# Patient Record
Sex: MALE | Race: WHITE | Employment: FULL TIME | URBAN - METROPOLITAN AREA
[De-identification: names, ages, dates, MRNs, and addresses within clinical notes are randomized per-mention and may not be internally consistent; named-entity substitution may affect disease eponyms.]

---

## 2017-10-10 ENCOUNTER — HOSPITAL ENCOUNTER (OUTPATIENT)
Dept: SURGERY | Age: 62
Discharge: HOME | End: 2017-10-10
Attending: INTERNAL MEDICINE | Admitting: INTERNAL MEDICINE

## 2017-10-10 VITALS
OXYGEN SATURATION: 99 % | BODY MASS INDEX: 31.34 KG/M2 | WEIGHT: 195 LBS | TEMPERATURE: 97.2 F | DIASTOLIC BLOOD PRESSURE: 68 MMHG | HEIGHT: 66 IN | RESPIRATION RATE: 16 BRPM | SYSTOLIC BLOOD PRESSURE: 113 MMHG | HEART RATE: 74 BPM

## 2017-10-10 RX ORDER — SODIUM CHLORIDE, SODIUM LACTATE, POTASSIUM CHLORIDE, CALCIUM CHLORIDE 600; 310; 30; 20 MG/100ML; MG/100ML; MG/100ML; MG/100ML
INJECTION, SOLUTION INTRAVENOUS CONTINUOUS
Status: DISCONTINUED | OUTPATIENT
Start: 2017-10-10 | End: 2017-10-11 | Stop reason: HOSPADM

## 2017-10-10 RX ORDER — LIDOCAINE HYDROCHLORIDE 10 MG/ML
0.1 INJECTION, SOLUTION EPIDURAL; INFILTRATION; INTRACAUDAL; PERINEURAL ONCE
Status: DISCONTINUED | OUTPATIENT
Start: 2017-10-10 | End: 2017-10-11 | Stop reason: HOSPADM

## 2017-10-10 RX ORDER — LISINOPRIL 20 MG/1
20 TABLET ORAL DAILY
COMMUNITY

## 2017-10-10 RX ADMIN — SODIUM CHLORIDE, SODIUM LACTATE, POTASSIUM CHLORIDE, CALCIUM CHLORIDE: 600; 310; 30; 20 INJECTION, SOLUTION INTRAVENOUS at 07:34

## 2017-10-10 ASSESSMENT — PAIN SCALES - GENERAL
PAINLEVEL_OUTOF10: 0

## 2017-10-10 ASSESSMENT — PAIN - FUNCTIONAL ASSESSMENT: PAIN_FUNCTIONAL_ASSESSMENT: 0-10

## 2017-10-10 NOTE — H&P
Pre-sedation Assessment    History and Physical / Pre-Sedation Assessment  Patient:  Jeraldene Sandhoff   :   1955     Intended Procedure: EGD      HPI: dysphagia  Nurses notes reviewed and agreed. Medications reviewed  Allergies: Allergies   Allergen Reactions    Atorvastatin      Myalgia at higher doses    Lipitor      Myalgia             Physical Exam:  Vital Signs: /76   Pulse 77   Temp 97.2 °F (36.2 °C) (Temporal)   Resp 18   Ht 5' 6\" (1.676 m)   Wt 195 lb (88.5 kg)   SpO2 93%   BMI 31.47 kg/m²  Body mass index is 31.47 kg/m². Airway:Normal  Cardiac:Normal  Pulmonary:Normal  Abdomen:Normal  Specific to procedure:       Pre-Procedure Assessment/Plan:  ASA 2 - Patient with mild systemic disease with no functional limitations    Level of Sedation Plan: Moderate sedation    Post Procedure plan: Return to same level of care    I assessed the patient and find that the patient is in satisfactory condition to proceed with the planned procedure and sedation plan. I have explained the risk, benefits, and alternatives to the procedure. The patient understands and agrees to proceed.   Yes    Nona Camarillo  7:54 AM 10/10/2017

## 2017-10-10 NOTE — PROCEDURES
were not  visualized. Antegrade and retrograde views of a fully distended  stomach did not demonstrate mucosal abnormalities nor gastric fold  thickening. Scope was then withdrawn through a 2-cm hiatal hernia. The GE junction was noted to be at 38 cm. Signs of Recio's were not  appreciated. A somewhat fibrous Schatzki's ring was appreciated. Through the scope, balloon was inflated to 15 mm for 1 minute. It was  then deflated. The rent in the ring was noted. Spontaneous hemostasis  was appreciated. Yellowish nodules were biopsied. These were located  in the proximal esophagus. A gastric inlet patch was noted. The  scope was then withdrawn. The patient's procedure was terminated,  well tolerated, no immediate complications. POSTPROCEDURE DIAGNOSES:  Esophageal nodule, esophageal ring status  post dilatation, hiatal hernia. I will contact this gentleman with biopsy results. He has been  instructed to call should dysphagia recur.         Sara De Leon MD    D: 10/10/2017 8:12:44       T: 10/10/2017 8:15:25     [email or web address]  Job#: [id number]     Doc#: [id number]    CC:  Zackery Snider PA-C

## 2017-10-10 NOTE — PROGRESS NOTES
Discharge in no distress: accompanied pt to passenger side of car with family/significant other driving. Resp WNL. Pt's significant other verbalized understanding of d/c instructions and verbalizes no additional questions.  Pain  in a tolerable level=0

## 2018-01-31 ENCOUNTER — OFFICE VISIT (OUTPATIENT)
Dept: ORTHOPEDIC SURGERY | Age: 63
End: 2018-01-31

## 2018-01-31 VITALS
BODY MASS INDEX: 31.36 KG/M2 | HEART RATE: 93 BPM | WEIGHT: 195.11 LBS | SYSTOLIC BLOOD PRESSURE: 123 MMHG | HEIGHT: 66 IN | DIASTOLIC BLOOD PRESSURE: 68 MMHG

## 2018-01-31 DIAGNOSIS — S39.012A LUMBAR STRAIN, INITIAL ENCOUNTER: ICD-10-CM

## 2018-01-31 DIAGNOSIS — M54.50 LUMBAR SPINE PAIN: Primary | ICD-10-CM

## 2018-01-31 PROCEDURE — 99203 OFFICE O/P NEW LOW 30 MIN: CPT | Performed by: PHYSICIAN ASSISTANT

## 2018-02-01 NOTE — PROGRESS NOTES
Patient: Franky Kapadia    MRN: [id number]  YOB: 1955          Age: 63 y.o. Sex: male    Subjective     Chief Complaint:  Back Pain (LBP RIGHT SIDED PAIN FROM MOVING A COOLER ON SUNDAY)      History of Present Illness:  Franky Kapadia is a 63 y.o. male who presents tonight for evaluation of low back pain. Patient states that on Sunday he was moving a heavy cooler when he began experiencing pain over the right low back. He states that he was in [place] and had to return to [place] in the drive back increased his back pain. He states by Sunday night he was unable to get out of the car without assistance. He did contact his primary care physician who prescribed Robaxin and told him to take ibuprofen 800 mg 3 times a day with food. He states that today his pain has improved but he still is having significant discomfort with arising from a seated position, standing for any period to time, and with getting out of bed. Pain is below his waist line and is midline. He denies any radiation into his lower extremities. He denies any bowel or bladder dysfunction and denies any weakness or paresthesias. There's been no significant increase with cough, sneeze, or strain.     Pain Assessment  Location of Pain: Back  Location Modifiers: Right  Severity of Pain: 7  Quality of Pain: Aching  Duration of Pain: Persistent  Frequency of Pain: Constant  Aggravating Factors: Bending, Stretching, Straightening, Standing, Walking, Stairs  Limiting Behavior: Yes  Relieving Factors: Rest  Result of Injury: Yes  Work-Related Injury: No  Are there other pain locations you wish to document?: No      Medical History  Current Medications:   Current Outpatient Prescriptions   Medication Sig Dispense Refill    lisinopril (PRINIVIL;ZESTRIL) 20 MG tablet Take 20 mg by mouth daily      atorvastatin (LIPITOR) 20 MG tablet Take 20 mg by mouth daily      Triamcinolone Acetonide (NASACORT ALLERGY 24HR NA) by

## 2018-02-01 NOTE — PATIENT INSTRUCTIONS
Patient is to continue with his Robaxin and ibuprofen and he was given lumbar flexibility and core strengthening exercises. He was also shown a lumbar corset that he will obtain on his own. This would be worn with all his physical activity and taken off at rest.  He will follow-up with Heidi Smith in 2 weeks for her continued evaluation care.

## 2019-07-15 ENCOUNTER — OFFICE VISIT (OUTPATIENT)
Dept: ORTHOPEDIC SURGERY | Age: 64
End: 2019-07-15
Payer: COMMERCIAL

## 2019-07-15 VITALS — WEIGHT: 196 LBS | RESPIRATION RATE: 15 BRPM | HEIGHT: 67 IN | BODY MASS INDEX: 30.76 KG/M2

## 2019-07-15 DIAGNOSIS — M25.551 RIGHT HIP PAIN: Primary | ICD-10-CM

## 2019-07-15 DIAGNOSIS — M70.61 TROCHANTERIC BURSITIS OF RIGHT HIP: ICD-10-CM

## 2019-07-15 PROCEDURE — 99213 OFFICE O/P EST LOW 20 MIN: CPT | Performed by: PHYSICIAN ASSISTANT

## 2024-02-18 ENCOUNTER — APPOINTMENT (OUTPATIENT)
Dept: GENERAL RADIOLOGY | Age: 69
End: 2024-02-18
Payer: COMMERCIAL

## 2024-02-18 ENCOUNTER — HOSPITAL ENCOUNTER (EMERGENCY)
Age: 69
Discharge: HOME | End: 2024-02-18
Payer: COMMERCIAL

## 2024-02-18 ENCOUNTER — APPOINTMENT (OUTPATIENT)
Dept: CT IMAGING | Age: 69
End: 2024-02-18
Payer: COMMERCIAL

## 2024-02-18 VITALS
HEIGHT: 67 IN | OXYGEN SATURATION: 100 % | BODY MASS INDEX: 30.76 KG/M2 | SYSTOLIC BLOOD PRESSURE: 165 MMHG | TEMPERATURE: 99.6 F | RESPIRATION RATE: 16 BRPM | DIASTOLIC BLOOD PRESSURE: 91 MMHG | WEIGHT: 196 LBS | HEART RATE: 104 BPM

## 2024-02-18 DIAGNOSIS — S01.112A LACERATION OF LEFT EYEBROW, INITIAL ENCOUNTER: ICD-10-CM

## 2024-02-18 DIAGNOSIS — S60.519A ABRASION OF HAND, UNSPECIFIED LATERALITY, INITIAL ENCOUNTER: ICD-10-CM

## 2024-02-18 DIAGNOSIS — S09.90XA INJURY OF HEAD, INITIAL ENCOUNTER: Primary | ICD-10-CM

## 2024-02-18 PROCEDURE — 73130 X-RAY EXAM OF HAND: CPT

## 2024-02-18 PROCEDURE — 99284 EMERGENCY DEPT VISIT MOD MDM: CPT

## 2024-02-18 PROCEDURE — 70450 CT HEAD/BRAIN W/O DYE: CPT

## 2024-02-18 PROCEDURE — 12014 RPR F/E/E/N/L/M 5.1-7.5 CM: CPT

## 2024-02-18 PROCEDURE — 72125 CT NECK SPINE W/O DYE: CPT

## 2024-02-18 ASSESSMENT — PAIN - FUNCTIONAL ASSESSMENT
PAIN_FUNCTIONAL_ASSESSMENT: 0-10
PAIN_FUNCTIONAL_ASSESSMENT: NONE - DENIES PAIN

## 2024-02-18 ASSESSMENT — PAIN DESCRIPTION - ORIENTATION: ORIENTATION: RIGHT;LEFT

## 2024-02-18 ASSESSMENT — PAIN SCALES - GENERAL: PAINLEVEL_OUTOF10: 3

## 2024-02-18 ASSESSMENT — PAIN DESCRIPTION - LOCATION: LOCATION: HAND;HEAD

## 2024-02-18 NOTE — ED NOTES
Pt bilateral hand abrasions cleansed with Hibiclens/Normal Saline/applied with PSO/Large Band-aids.

## 2024-02-19 NOTE — ED NOTES
Pt suture repair cleansed with Hibiclens/Normal Saline to remove dried blood from face. Pt suture repair continues to bleed small amount. Gauze/turbine wrap applied for pressure dressing. Pt instructed to remove wrap tomorrow.

## 2024-02-19 NOTE — ED PROVIDER NOTES
HEMORRHAGE, SUBDURAL HEMATOMA, OR STROKE, CELLULITIS, COMPARTMENT SYNDROME, NECROTIZING FASCIITIS, TENDON OR NEUROVASCULAR INJURY, or FOREIGN BODYthus I consider the discharge disposition reasonable. Benja Grandaos and I have also discussed returning to the Emergency Department immediately if new or worsening symptoms occur. We have discussed the symptoms which are most concerning (e.g., changing or worsening pain, weakness, vomiting, fever) that necessitate immediate return.    FINAL IMPRESSION  1. Injury of head, initial encounter    2. Laceration of left eyebrow, initial encounter    3. Abrasion of hand, unspecified laterality, initial encounter      Patient referred to:  PCP    Schedule an appointment as soon as possible for a visit   For wound re-check in 2-3 days, For suture removal in 5-7 days.    [facility]  ED  [address] [facility] [address] [phone]    If symptoms worsen    Discharge Medications:   New Prescriptions    No medications on file     Discontinued Medications:  Discontinued Medications    No medications on file     Risk management discussed and shared decision making had with patient and/or surrogate. All questions were answered. Patient will follow up with PCP in 2 to 3 days for wound check and within 5 to 7 days for suture removal and for further evaluation/treatment.  All questions answered.  Patient will return to ED for new/worsening symptoms.    DISPOSITION:  Patient was discharged home in stable condition.    (Please note that portions of this note were completed with a voice recognition program.  Efforts were made to edit the dictations but occasionally words are mis-transcribed).         Lei Pat PA  02/18/24 [id number]